# Patient Record
Sex: FEMALE | Race: OTHER | Employment: FULL TIME | URBAN - METROPOLITAN AREA
[De-identification: names, ages, dates, MRNs, and addresses within clinical notes are randomized per-mention and may not be internally consistent; named-entity substitution may affect disease eponyms.]

---

## 2022-03-09 ENCOUNTER — APPOINTMENT (OUTPATIENT)
Dept: GENERAL RADIOLOGY | Facility: HOSPITAL | Age: 52
End: 2022-03-09
Attending: EMERGENCY MEDICINE
Payer: COMMERCIAL

## 2022-03-09 ENCOUNTER — HOSPITAL ENCOUNTER (EMERGENCY)
Facility: HOSPITAL | Age: 52
Discharge: HOME | End: 2022-03-09
Attending: EMERGENCY MEDICINE
Payer: COMMERCIAL

## 2022-03-09 VITALS
WEIGHT: 135 LBS | DIASTOLIC BLOOD PRESSURE: 64 MMHG | TEMPERATURE: 98 F | SYSTOLIC BLOOD PRESSURE: 111 MMHG | BODY MASS INDEX: 29.12 KG/M2 | OXYGEN SATURATION: 96 % | RESPIRATION RATE: 10 BRPM | HEIGHT: 57 IN | HEART RATE: 72 BPM
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
## 2022-03-09 DIAGNOSIS — S20.219A CONTUSION OF CHEST WALL, UNSPECIFIED LATERALITY, INITIAL ENCOUNTER: Primary | ICD-10-CM

## 2022-03-09 DIAGNOSIS — S39.012A LOW BACK STRAIN, INITIAL ENCOUNTER: ICD-10-CM

## 2022-03-09 LAB
ATRIAL RATE: 73 BPM
P AXIS: 71 DEGREES
P-R INTERVAL: 152 MS
Q-T INTERVAL: 366 MS
QRS DURATION: 72 MS
QTC CALCULATION (BEZET): 403 MS
R AXIS: 29 DEGREES
T AXIS: 39 DEGREES
VENTRICULAR RATE: 73 BPM

## 2022-03-09 PROCEDURE — 72110 X-RAY EXAM L-2 SPINE 4/>VWS: CPT | Performed by: EMERGENCY MEDICINE

## 2022-03-09 PROCEDURE — 99284 EMERGENCY DEPT VISIT MOD MDM: CPT

## 2022-03-09 PROCEDURE — 93005 ELECTROCARDIOGRAM TRACING: CPT

## 2022-03-09 PROCEDURE — 71046 X-RAY EXAM CHEST 2 VIEWS: CPT | Performed by: EMERGENCY MEDICINE

## 2022-03-09 PROCEDURE — 93010 ELECTROCARDIOGRAM REPORT: CPT
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
## 2022-06-28 ENCOUNTER — APPOINTMENT (OUTPATIENT)
Dept: GENERAL RADIOLOGY | Age: 52
End: 2022-06-28
Attending: PHYSICIAN ASSISTANT
Payer: MEDICAID

## 2022-06-28 ENCOUNTER — HOSPITAL ENCOUNTER (OUTPATIENT)
Age: 52
Discharge: HOME | End: 2022-06-28
Payer: MEDICAID

## 2022-06-28 VITALS
TEMPERATURE: 98 F | DIASTOLIC BLOOD PRESSURE: 83 MMHG | OXYGEN SATURATION: 97 % | HEART RATE: 103 BPM | SYSTOLIC BLOOD PRESSURE: 120 MMHG | RESPIRATION RATE: 18 BRPM

## 2022-06-28 DIAGNOSIS — M25.512 ACUTE PAIN OF LEFT SHOULDER: Primary | ICD-10-CM

## 2022-06-28 DIAGNOSIS — M75.32 CALCIFIC TENDINITIS OF LEFT SHOULDER: ICD-10-CM

## 2022-06-28 PROCEDURE — 96372 THER/PROPH/DIAG INJ SC/IM: CPT | Performed by: PHYSICIAN ASSISTANT

## 2022-06-28 PROCEDURE — 73030 X-RAY EXAM OF SHOULDER: CPT | Performed by: PHYSICIAN ASSISTANT

## 2022-06-28 PROCEDURE — 72050 X-RAY EXAM NECK SPINE 4/5VWS: CPT | Performed by: PHYSICIAN ASSISTANT

## 2022-06-28 PROCEDURE — 99203 OFFICE O/P NEW LOW 30 MIN: CPT | Performed by: PHYSICIAN ASSISTANT

## 2022-06-28 RX ORDER — KETOROLAC TROMETHAMINE 30 MG/ML
60 INJECTION, SOLUTION INTRAMUSCULAR; INTRAVENOUS ONCE
Status: COMPLETED | OUTPATIENT
Start: 2022-06-28 | End: 2022-06-28

## 2022-06-28 NOTE — ED INITIAL ASSESSMENT (HPI)
Patient c/o left shoulder pain since Saturday. Does not recall an injury. Last dose of Tylenol at 1400.

## 2022-06-29 ENCOUNTER — TELEPHONE (OUTPATIENT)
Dept: ORTHOPEDICS CLINIC | Facility: CLINIC | Age: 52
End: 2022-06-29

## 2022-06-29 DIAGNOSIS — M25.512 LEFT SHOULDER PAIN, UNSPECIFIED CHRONICITY: Primary | ICD-10-CM

## 2022-06-29 NOTE — TELEPHONE ENCOUNTER
Patient is scheduled with Dr. Jenny Walls for left shoulder pain. Please advise if imaging is needed.

## 2022-07-08 ENCOUNTER — OFFICE VISIT (OUTPATIENT)
Dept: ORTHOPEDICS CLINIC | Facility: CLINIC | Age: 52
End: 2022-07-08
Payer: MEDICAID

## 2022-07-08 VITALS — HEART RATE: 99 BPM | OXYGEN SATURATION: 97 %

## 2022-07-08 DIAGNOSIS — M75.02 ADHESIVE CAPSULITIS OF LEFT SHOULDER: Primary | ICD-10-CM

## 2022-07-08 PROCEDURE — 20610 DRAIN/INJ JOINT/BURSA W/O US: CPT | Performed by: ORTHOPAEDIC SURGERY

## 2022-07-08 PROCEDURE — 99204 OFFICE O/P NEW MOD 45 MIN: CPT | Performed by: ORTHOPAEDIC SURGERY

## 2022-07-08 RX ORDER — KETOROLAC TROMETHAMINE 30 MG/ML
30 INJECTION, SOLUTION INTRAMUSCULAR; INTRAVENOUS ONCE
Status: COMPLETED | OUTPATIENT
Start: 2022-07-08 | End: 2022-07-08

## 2022-07-08 RX ORDER — TRIAMCINOLONE ACETONIDE 40 MG/ML
40 INJECTION, SUSPENSION INTRA-ARTICULAR; INTRAMUSCULAR ONCE
Status: COMPLETED | OUTPATIENT
Start: 2022-07-08 | End: 2022-07-08

## 2022-07-08 RX ADMIN — TRIAMCINOLONE ACETONIDE 40 MG: 40 INJECTION, SUSPENSION INTRA-ARTICULAR; INTRAMUSCULAR at 12:15:00

## 2022-07-08 RX ADMIN — KETOROLAC TROMETHAMINE 30 MG: 30 INJECTION, SOLUTION INTRAMUSCULAR; INTRAVENOUS at 12:15:00

## 2022-07-09 NOTE — PROCEDURES
Left Shoulder Glenohumeral Joint Injection    Name: Selena Husain   MRN: [id number]  Date: 7/8/2022     Clinical Indications:   Adhesive Capsulitis. After informed consent, the injection site was marked, sterilized with topical chlorhexidine antiseptic, and locally anesthetized with skin refrigerant. The patient was seated upright and the shoulder was exposed. Using sterile technique: 1 mL of 30mg/mL of Ketorolac, 2 mL of 0.5% Bupivicaine, 2 mL of 1% Lidocaine, and 1 mg of 40mg/mL of Triamcinolone (Kenalog) was injected with a Anterior approach utilizing a 21 gauge needle. A band-aid was applied. The patient tolerated the procedure well. Disposition:   Return to clinic on an as needed basis. Maria R Perez. Mari Mullins MD  Knee, Shoulder, & Elbow Surgery / Sports Medicine Specialist  [facility]  [place] [address], [place] , [address] [facility]. [email or web address]  t: [phone]  o: [phone]  f: [phone]

## 2024-08-01 ENCOUNTER — OFFICE VISIT (OUTPATIENT)
Dept: ORTHOPEDICS CLINIC | Facility: CLINIC | Age: 54
End: 2024-08-01
Payer: MEDICAID

## 2024-08-01 VITALS — BODY MASS INDEX: 29.12 KG/M2 | WEIGHT: 135 LBS | HEIGHT: 57 IN

## 2024-08-01 DIAGNOSIS — M16.32 OSTEOARTHRITIS RESULTING FROM LEFT HIP DYSPLASIA: Primary | ICD-10-CM

## 2024-08-01 PROCEDURE — 99214 OFFICE O/P EST MOD 30 MIN: CPT | Performed by: FAMILY MEDICINE

## 2024-08-01 RX ORDER — PROPYLTHIOURACIL 50 MG/1
250 TABLET ORAL
COMMUNITY
Start: 2024-07-15 | End: 2024-10-13

## 2024-08-01 RX ORDER — GABAPENTIN 100 MG/1
1 CAPSULE ORAL AS NEEDED
COMMUNITY
Start: 2024-07-11

## 2024-08-01 NOTE — H&P
Sports Medicine Clinic Note     Subjective:    Chief Complaint: Left hip pain.    Interval History: The patient is a 53-year-old female with a history of left hip dysplasia who presents requesting disability. She previously saw Dr. Adrián Perrin in March 2020 for left hip and back pain, diagnosed with left hip dysplasia. The patient reports ongoing left hip pain, primarily in the groin area, and occasional lower back pain. She states the primary reason for her visit today is to discuss long-term disability, which she was informed would not be filled out by her PCP. She has been managing her symptoms without pain medication and does not require assistive devices for ambulation. She has no significant changes in her activity level and is currently able to perform her work duties without difficulty, which do not involve heavy lifting, pushing, or pulling. She also brings  up that she suffered a hand injury in Feb of 2024 which has resolved.    Objective:    Left Hip Examination:    Inspection:  Non-antalgic gait  No visible deformities or muscle atrophy  No erythema or swelling noted    Palpation:  Tenderness over the left groin area  No tenderness over the lateral hip or trochanteric region    Range of Motion:  Flexion to 110 degrees with minimal pain  Extension to neutral  Internal rotation to 25 degrees  External rotation to 30 degrees  Abduction to 30 degrees  Adduction to 30 degrees    Neurovascular:  Sensation intact to light touch  Motor function intact in the EHL, TA, and GS muscles  2+ DP and PT pulses, brisk capillary refill    Special Tests:  Positive Stinchfield test  Positive AVIVA test  Positive FADIR test    Diagnostic Tests:    X-rays of the left hip reviewed today show left hip dysplasia with slight abnormality of the femoral head, and degenerative changes.     Assessment:    Left hip dysplasia with degenerative changes/secondary arthritis.    Plan:    I declined filling out disability paperwork for  her as I think her issue is treatable and tried to explain Dr. Vanessa documented plan for her and reason for not pursuing interventions previously.    Therapy: Recommend physical therapy to strengthen hip stabilizing muscles and improve range of motion. Deferred for now.     Medications: Continue to manage pain with over-the-counter NSAIDs as needed.    Procedures: Discussed the option of a steroid injection for symptomatic relief. The patient deferred diagnostic hip injection today. Hip replacement surgery remains an option if symptoms progress but recommend she complete a CSI first.    Activity Recommendations: Continue current work duties. Avoid activities that exacerbate hip pain.    Follow-Up: Patient may follow up as needed if symptoms worsen or new symptoms develop.      Salvatore Meyer DO, CAQSM   Primary Care Sports Medicine    Department of Orthopaedic Surgery  [facility]    [address] [place] [id number]  [address] [place] [id number]    t: [phone]  f: [phone]      [email or web address]

## (undated) NOTE — LETTER
Date: 7/8/2022    Patient Name: Brigitte Peterson          To Whom it may concern: This letter has been written at the patient's request. The above patient was seen at the [facility] for treatment of a medical condition. Please excuse this patient from work effective today, 07/08/2022 until 07/12/2022. For any questions, please feel free to contact us. Sincerely,      Dewey Carl. Timothy Tsang MD  Knee, Shoulder, & Elbow Surgery / Sports Medicine Specialist  [facility]  [place] [address], [address] [facility]. [email or web address]  t: [phone]  o: [phone]  f: [phone]      [email or web address] MD MULUGETA

## (undated) NOTE — LETTER
[facility]  Dept: [phone]  Dept Fax: [phone]         June 28, 2022    Patient: Gee Guajardo   YOB: 1970   Date of Visit: 6/28/2022       To Whom It May Concern:    Gee Guajardo was seen and treated in our Immediate Care department on 6/28/2022. She may return to work after being cleared by the Ortho physician. If you have any questions or concerns, please don't hesitate to call.       Encounter Provider(s):    Eulalia Severs, [place]     7:32 PM  6/28/2022